# Patient Record
Sex: FEMALE | Race: OTHER | HISPANIC OR LATINO
[De-identification: names, ages, dates, MRNs, and addresses within clinical notes are randomized per-mention and may not be internally consistent; named-entity substitution may affect disease eponyms.]

---

## 2022-07-25 ENCOUNTER — APPOINTMENT (OUTPATIENT)
Dept: ORTHOPEDIC SURGERY | Facility: CLINIC | Age: 52
End: 2022-07-25

## 2022-09-12 DIAGNOSIS — M25.562 PAIN IN LEFT KNEE: ICD-10-CM

## 2022-09-12 PROBLEM — Z00.00 ENCOUNTER FOR PREVENTIVE HEALTH EXAMINATION: Status: ACTIVE | Noted: 2022-09-12

## 2022-09-13 ENCOUNTER — APPOINTMENT (OUTPATIENT)
Dept: ORTHOPEDIC SURGERY | Facility: CLINIC | Age: 52
End: 2022-09-13

## 2022-09-13 VITALS — DIASTOLIC BLOOD PRESSURE: 75 MMHG | WEIGHT: 200 LBS | SYSTOLIC BLOOD PRESSURE: 113 MMHG | HEART RATE: 69 BPM

## 2022-09-13 PROCEDURE — 73564 X-RAY EXAM KNEE 4 OR MORE: CPT | Mod: LT

## 2022-09-13 PROCEDURE — 99204 OFFICE O/P NEW MOD 45 MIN: CPT | Mod: 25

## 2022-09-13 PROCEDURE — 20610 DRAIN/INJ JOINT/BURSA W/O US: CPT | Mod: LT

## 2022-09-13 NOTE — PHYSICAL EXAM
[de-identified] : GENERAL APPEARANCE: Well nourished and hydrated, pleasant, alert, and oriented x 3. Appears their stated age. \par HEENT: Normocephalic, extraocular eye motion intact. Nasal septum midline. Oral cavity clear. External auditory canal clear. \par RESPIRATORY: Breath sounds clear and audible in all lobes. No wheezing, No accessory muscle use.\par CARDIOVASCULAR: No apparent abnormalities. No lower leg edema. No varicosities. Pedal pulses are palpable.\par NEUROLOGIC: Sensation is normal, no muscle weakness in the upper or lower extremities.\par DERMATOLOGIC: No apparent skin lesions, moist, warm, no rash.\par SPINE: Cervical spine appears normal and moves freely; thoracic spine appears normal and moves freely; lumbosacral spine appears normal and moves freely, normal, nontender.\par MUSCULOSKELETAL: Hands, wrists, and elbows are normal and move freely, shoulders are normal and move freely. \par Psychiatric: Oriented to person, place, and time, insight and judgement were intact and the affect was normal. \par Musculoskeletal:. Left knee exam shows mild effusion, ROM is 0-1 25 degrees, no instability, no pain with Marsha, medial and lateral joint line tenderness. \par 5/5 motor strength in bilateral lower extremities. Sensory: Intact in bilateral lower extremities. DTRs: Biceps, brachioradialis, triceps, patellar, ankle and plantar 2+ and symmetric bilaterally. Pulses: dorsalis pedis, posterior tibial, femoral, popliteal, and radial 2+ and symmetric bilaterally. \par Constitutional: Alert and in no acute distress, but well-appearing.  [de-identified] : 4 views of the left knee obtained the office today show no acute fracture or dislocation.  There is medial joint space narrowing subchondral sclerosis small osteophyte formation mild tricompartmental degenerative changes consistent with Kellgren-Danny grade 2 changes.

## 2022-09-13 NOTE — DISCUSSION/SUMMARY
[Medication Risks Reviewed] : Medication risks reviewed [Surgical risks reviewed] : Surgical risks reviewed [de-identified] : Patient is a 52-year-old female with mild to moderate left knee osteoarthritis most pronounced the medial compartment presenting today for initial evaluation.  I recommended conservative treatment at this time.  I have given her a injection of cortisone which she tolerated well.  I recommended physical therapy and she should continue with that.  She is continue take NSAIDs as needed for the pain.  I will see her back in 2 months for repeat evaluation.  All questions were asked and answered with

## 2022-09-13 NOTE — REASON FOR VISIT
[Initial Visit] : an initial visit for [Other: ____] : [unfilled] [[employer] ] : provided by [employer]   [Interpreters_IDNumber] : [id number] [Interpreters_FullName] : Aron [[id number]] : English

## 2022-09-13 NOTE — PROCEDURE
[de-identified] : I injected his left knee.\par I discussed at length with the patient the planned steroid and lidocaine injection. The risks, benefits, convalescence and alternatives were reviewed. The possible side effects discussed included but were not limited to: pain, swelling, heat, bleeding, and redness. Symptoms are generally mild but if they are extensive then contact the office. Giving pain relievers by mouth such as NSAIDs or Tylenol can generally treat the reactions to steroid and lidocaine. Rare cases of infection have been noted. Rash, hives and itching may occur post injection. If you have muscle pain or cramps, flushing and or swelling of the face, rapid heart beat, nausea, dizziness, fever, chills, headache, difficulty breathing, swelling in the arms or legs, or have a prickly feeling of your skin, contact a health care provider immediately. Following this discussion, the knee was prepped with Alcohol and under sterile condition the 80 mg Depo-Medrol and 6 cc Lidocaine injection was performed with a 20 gauge needle through a superolateral injection site. The needle was introduced into the joint, aspiration was performed to ensure intra-articular placement and the medication was injected. Upon withdrawal of the needle the site was cleaned with alcohol and a band aid applied. The patient tolerated the injection well and there were no adverse effects. Post injection instructions included no strenuous activity for 24 hours, cryotherapy and if there are any adverse effects to contact the office.\par

## 2022-09-13 NOTE — HISTORY OF PRESENT ILLNESS
[Worsening] : worsening [6] : an average pain level of 6/10 [9] : a maximum pain level of 9/10 [Standing] : standing [Daily] : ~He/She~ states the symptoms seem to be occuring daily [Bending] : worsened by bending [Direct Pressure] : worsened by direct pressure [Sitting] : worsened by sitting [Walking] : worsened by walking [Knee Flexion] : worsened with knee flexion [Knee Extension] : worsened with knee extension [NSAIDs] : relieved by nonsteroidal anti-inflammatory drugs [Rest] : relieved by rest [de-identified] : 52-year-old female with past medical history of hypertension and diabetes presents to the office for initial evaluation of left knee pain.  Patient states she has had the left  knee pain for the past 6 months.  She has seen 2 orthopedists in the past, she was diagnosed with a Baker's cyst that burst.  She has been to physical therapy with little relief of pain, and has also received a cortisone injection which helped for short period of time.  She has been taking meloxicam for the pain with moderate relief.  She states the pain is mostly over the lateral and medial joint lines as well as the posterior aspect of the knee, it is localized and does not radiate to the hip or the back.  She also complains of swelling to the left knee.  Patient states the pain is exacerbated by walking, rising from a seated position, getting out of the car.  States that she does sometimes need to use a cane to ambulate.  Patient states that the last orthopedist that she saw said that she needed surgery but he did not take her insurance which is why she is here today.  Denies any recent falls or injury, locking catching or buckling of the left knee, numbness/tingling. [Hip Movement] : not worsened by hip movement [Acetaminophen] : not relieved by acetaminophen [Physical Therapy] : not relieved by physical therapy

## 2022-11-14 ENCOUNTER — APPOINTMENT (OUTPATIENT)
Dept: ORTHOPEDIC SURGERY | Facility: CLINIC | Age: 52
End: 2022-11-14

## 2022-11-14 VITALS
WEIGHT: 195 LBS | DIASTOLIC BLOOD PRESSURE: 79 MMHG | HEIGHT: 61 IN | HEART RATE: 78 BPM | BODY MASS INDEX: 36.82 KG/M2 | SYSTOLIC BLOOD PRESSURE: 118 MMHG

## 2022-11-14 DIAGNOSIS — M17.12 UNILATERAL PRIMARY OSTEOARTHRITIS, LEFT KNEE: ICD-10-CM

## 2022-11-14 PROCEDURE — 99213 OFFICE O/P EST LOW 20 MIN: CPT

## 2022-11-14 NOTE — HISTORY OF PRESENT ILLNESS
[Improving] : improving [Intermit.] : ~He/She~ states the symptoms seem to be intermittent [Exercise Regimen] : relieved by exercise regimen [Physical Therapy] : relieved by physical therapy [de-identified] : 52 year old female presents to office for follow up of left knee pain following the start of conservative therapies. The patient received a cortisone injection at her list visit in September. She states that the injection has provided her with very good pain relief. She is able to walk and move much easier. She states that her pain has resloved and she only feels some achiness in left knee sometimes. She has not required pain medication. She has been attending physical therapy. Denies any recent injuires or falls, new symptoms. She states that she is having a surgical procedure next week to remove uterine fibroids and plans to return to PT after the procedure is complete.

## 2022-11-14 NOTE — PHYSICAL EXAM
[de-identified] : Musculoskeletal:. Left knee exam shows no effusion, ROM is 0-1 25 degrees, no instability, no pain with Marsha, medial and lateral joint line tenderness. \par 5/5 motor strength in bilateral lower extremities. Sensory: Intact in bilateral lower extremities. DTRs: Biceps, brachioradialis, triceps, patellar, ankle and plantar 2+ and symmetric bilaterally. Pulses: dorsalis pedis, posterior tibial, femoral, popliteal, and radial 2+ and symmetric bilaterally. \par Constitutional: Alert and in no acute distress, but well-appearing.

## 2022-11-14 NOTE — DISCUSSION/SUMMARY
[Medication Risks Reviewed] : Medication risks reviewed [de-identified] : Patient is a 52-year-old female with left knee osteoarthritis presenting today for follow-up.  She has had excellent response to cortisone injection since her last visit.  She is no longer having any pain or dysfunction of the knee.  She is back to her daily activities.  I recommended she take Tylenol and NSAIDs as needed for the pain.  She is continue with her at home exercises.  I will see her back on an as-needed basis for her left knee.  All questions were asked and answered with .

## 2022-11-14 NOTE — REASON FOR VISIT
[Follow-Up Visit] : a follow-up visit for [Other: ____] : [unfilled] [[employer] ] : provided by [employer]   [Interpreters_IDNumber] : [id number] [Interpreters_FullName] : Tisha [[id number]] : Hungarian

## 2023-08-29 ENCOUNTER — OFFICE (OUTPATIENT)
Dept: URBAN - METROPOLITAN AREA CLINIC 110 | Facility: CLINIC | Age: 53
Setting detail: OPHTHALMOLOGY
End: 2023-08-29
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
## 2023-08-29 PROCEDURE — NO SHOW FE NO SHOW FEE: Performed by: OPHTHALMOLOGY

## 2023-09-18 ENCOUNTER — APPOINTMENT (OUTPATIENT)
Dept: ORTHOPEDIC SURGERY | Facility: CLINIC | Age: 53
End: 2023-09-18

## 2023-09-26 ENCOUNTER — INPATIENT (INPATIENT)
Facility: HOSPITAL | Age: 53
LOS: 0 days | Discharge: HOME | DRG: 84 | End: 2023-09-27
Attending: STUDENT IN AN ORGANIZED HEALTH CARE EDUCATION/TRAINING PROGRAM | Admitting: STUDENT IN AN ORGANIZED HEALTH CARE EDUCATION/TRAINING PROGRAM
Payer: COMMERCIAL

## 2023-09-26 ENCOUNTER — EMERGENCY (EMERGENCY)
Facility: HOSPITAL | Age: 53
LOS: 1 days | Discharge: ANOTHER HOSPITAL | End: 2023-09-26
Attending: EMERGENCY MEDICINE | Admitting: EMERGENCY MEDICINE
Payer: MEDICAID

## 2023-09-26 VITALS
OXYGEN SATURATION: 94 % | DIASTOLIC BLOOD PRESSURE: 83 MMHG | HEART RATE: 62 BPM | WEIGHT: 199.96 LBS | RESPIRATION RATE: 18 BRPM | SYSTOLIC BLOOD PRESSURE: 132 MMHG | TEMPERATURE: 97 F

## 2023-09-26 VITALS
TEMPERATURE: 98 F | HEART RATE: 60 BPM | RESPIRATION RATE: 16 BRPM | DIASTOLIC BLOOD PRESSURE: 72 MMHG | OXYGEN SATURATION: 98 % | SYSTOLIC BLOOD PRESSURE: 113 MMHG

## 2023-09-26 VITALS
HEIGHT: 67 IN | SYSTOLIC BLOOD PRESSURE: 151 MMHG | WEIGHT: 199.96 LBS | RESPIRATION RATE: 16 BRPM | OXYGEN SATURATION: 99 % | HEART RATE: 57 BPM | DIASTOLIC BLOOD PRESSURE: 84 MMHG

## 2023-09-26 DIAGNOSIS — I62.00 NONTRAUMATIC SUBDURAL HEMORRHAGE, UNSPECIFIED: ICD-10-CM

## 2023-09-26 LAB
ALBUMIN SERPL ELPH-MCNC: 3.8 G/DL — SIGNIFICANT CHANGE UP (ref 3.3–5)
ALP SERPL-CCNC: 109 U/L — SIGNIFICANT CHANGE UP (ref 40–120)
ALT FLD-CCNC: 31 U/L — SIGNIFICANT CHANGE UP (ref 12–78)
ANION GAP SERPL CALC-SCNC: 4 MMOL/L — LOW (ref 5–17)
APTT BLD: 34 SEC — SIGNIFICANT CHANGE UP (ref 24.5–35.6)
AST SERPL-CCNC: 27 U/L — SIGNIFICANT CHANGE UP (ref 15–37)
BASOPHILS # BLD AUTO: 0.01 K/UL — SIGNIFICANT CHANGE UP (ref 0–0.2)
BASOPHILS NFR BLD AUTO: 0.1 % — SIGNIFICANT CHANGE UP (ref 0–2)
BILIRUB SERPL-MCNC: 0.3 MG/DL — SIGNIFICANT CHANGE UP (ref 0.2–1.2)
BUN SERPL-MCNC: 14 MG/DL — SIGNIFICANT CHANGE UP (ref 7–23)
CALCIUM SERPL-MCNC: 9.1 MG/DL — SIGNIFICANT CHANGE UP (ref 8.5–10.1)
CHLORIDE SERPL-SCNC: 110 MMOL/L — HIGH (ref 96–108)
CO2 SERPL-SCNC: 25 MMOL/L — SIGNIFICANT CHANGE UP (ref 22–31)
CREAT SERPL-MCNC: 0.64 MG/DL — SIGNIFICANT CHANGE UP (ref 0.5–1.3)
EGFR: 106 ML/MIN/1.73M2 — SIGNIFICANT CHANGE UP
EOSINOPHIL # BLD AUTO: 0.38 K/UL — SIGNIFICANT CHANGE UP (ref 0–0.5)
EOSINOPHIL NFR BLD AUTO: 5.6 % — SIGNIFICANT CHANGE UP (ref 0–6)
GLUCOSE SERPL-MCNC: 95 MG/DL — SIGNIFICANT CHANGE UP (ref 70–99)
HCT VFR BLD CALC: 39.3 % — SIGNIFICANT CHANGE UP (ref 34.5–45)
HGB BLD-MCNC: 13 G/DL — SIGNIFICANT CHANGE UP (ref 11.5–15.5)
IMM GRANULOCYTES NFR BLD AUTO: 0.6 % — SIGNIFICANT CHANGE UP (ref 0–0.9)
INR BLD: 0.96 RATIO — SIGNIFICANT CHANGE UP (ref 0.85–1.18)
LYMPHOCYTES # BLD AUTO: 2.51 K/UL — SIGNIFICANT CHANGE UP (ref 1–3.3)
LYMPHOCYTES # BLD AUTO: 37.1 % — SIGNIFICANT CHANGE UP (ref 13–44)
MCHC RBC-ENTMCNC: 29.2 PG — SIGNIFICANT CHANGE UP (ref 27–34)
MCHC RBC-ENTMCNC: 33.1 GM/DL — SIGNIFICANT CHANGE UP (ref 32–36)
MCV RBC AUTO: 88.3 FL — SIGNIFICANT CHANGE UP (ref 80–100)
MONOCYTES # BLD AUTO: 0.42 K/UL — SIGNIFICANT CHANGE UP (ref 0–0.9)
MONOCYTES NFR BLD AUTO: 6.2 % — SIGNIFICANT CHANGE UP (ref 2–14)
NEUTROPHILS # BLD AUTO: 3.4 K/UL — SIGNIFICANT CHANGE UP (ref 1.8–7.4)
NEUTROPHILS NFR BLD AUTO: 50.4 % — SIGNIFICANT CHANGE UP (ref 43–77)
NRBC # BLD: 0 /100 WBCS — SIGNIFICANT CHANGE UP (ref 0–0)
PLATELET # BLD AUTO: 282 K/UL — SIGNIFICANT CHANGE UP (ref 150–400)
POTASSIUM SERPL-MCNC: 4.2 MMOL/L — SIGNIFICANT CHANGE UP (ref 3.5–5.3)
POTASSIUM SERPL-SCNC: 4.2 MMOL/L — SIGNIFICANT CHANGE UP (ref 3.5–5.3)
PROT SERPL-MCNC: 8.1 G/DL — SIGNIFICANT CHANGE UP (ref 6–8.3)
PROTHROM AB SERPL-ACNC: 11.2 SEC — SIGNIFICANT CHANGE UP (ref 9.5–13)
RBC # BLD: 4.45 M/UL — SIGNIFICANT CHANGE UP (ref 3.8–5.2)
RBC # FLD: 12.6 % — SIGNIFICANT CHANGE UP (ref 10.3–14.5)
SODIUM SERPL-SCNC: 139 MMOL/L — SIGNIFICANT CHANGE UP (ref 135–145)
WBC # BLD: 6.76 K/UL — SIGNIFICANT CHANGE UP (ref 3.8–10.5)
WBC # FLD AUTO: 6.76 K/UL — SIGNIFICANT CHANGE UP (ref 3.8–10.5)

## 2023-09-26 PROCEDURE — 99285 EMERGENCY DEPT VISIT HI MDM: CPT

## 2023-09-26 PROCEDURE — 99285 EMERGENCY DEPT VISIT HI MDM: CPT | Mod: 25

## 2023-09-26 PROCEDURE — 85610 PROTHROMBIN TIME: CPT

## 2023-09-26 PROCEDURE — G1004: CPT

## 2023-09-26 PROCEDURE — 71260 CT THORAX DX C+: CPT | Mod: 26

## 2023-09-26 PROCEDURE — 73562 X-RAY EXAM OF KNEE 3: CPT | Mod: 26,LT

## 2023-09-26 PROCEDURE — 73590 X-RAY EXAM OF LOWER LEG: CPT

## 2023-09-26 PROCEDURE — 70450 CT HEAD/BRAIN W/O DYE: CPT | Mod: 26,MG

## 2023-09-26 PROCEDURE — 36415 COLL VENOUS BLD VENIPUNCTURE: CPT

## 2023-09-26 PROCEDURE — 99223 1ST HOSP IP/OBS HIGH 75: CPT | Mod: GC

## 2023-09-26 PROCEDURE — 80053 COMPREHEN METABOLIC PANEL: CPT

## 2023-09-26 PROCEDURE — 85025 COMPLETE CBC W/AUTO DIFF WBC: CPT

## 2023-09-26 PROCEDURE — 96365 THER/PROPH/DIAG IV INF INIT: CPT

## 2023-09-26 PROCEDURE — 74177 CT ABD & PELVIS W/CONTRAST: CPT | Mod: 26

## 2023-09-26 PROCEDURE — 70450 CT HEAD/BRAIN W/O DYE: CPT | Mod: MG

## 2023-09-26 PROCEDURE — 72125 CT NECK SPINE W/O DYE: CPT | Mod: MG

## 2023-09-26 PROCEDURE — 73562 X-RAY EXAM OF KNEE 3: CPT

## 2023-09-26 PROCEDURE — 85730 THROMBOPLASTIN TIME PARTIAL: CPT

## 2023-09-26 PROCEDURE — 70450 CT HEAD/BRAIN W/O DYE: CPT | Mod: 26,77

## 2023-09-26 PROCEDURE — 72125 CT NECK SPINE W/O DYE: CPT | Mod: 26,MG

## 2023-09-26 PROCEDURE — 73590 X-RAY EXAM OF LOWER LEG: CPT | Mod: 26,LT

## 2023-09-26 RX ORDER — HYDROMORPHONE HYDROCHLORIDE 2 MG/ML
0.5 INJECTION INTRAMUSCULAR; INTRAVENOUS; SUBCUTANEOUS EVERY 4 HOURS
Refills: 0 | Status: DISCONTINUED | OUTPATIENT
Start: 2023-09-26 | End: 2023-09-27

## 2023-09-26 RX ORDER — ACETAMINOPHEN 500 MG
1000 TABLET ORAL EVERY 6 HOURS
Refills: 0 | Status: DISCONTINUED | OUTPATIENT
Start: 2023-09-26 | End: 2023-09-27

## 2023-09-26 RX ORDER — KETOROLAC TROMETHAMINE 30 MG/ML
15 SYRINGE (ML) INJECTION EVERY 6 HOURS
Refills: 0 | Status: DISCONTINUED | OUTPATIENT
Start: 2023-09-26 | End: 2023-09-26

## 2023-09-26 RX ORDER — ACETAMINOPHEN 500 MG
1000 TABLET ORAL EVERY 6 HOURS
Refills: 0 | Status: DISCONTINUED | OUTPATIENT
Start: 2023-09-26 | End: 2023-09-26

## 2023-09-26 RX ORDER — LIDOCAINE 4 G/100G
1 CREAM TOPICAL ONCE
Refills: 0 | Status: COMPLETED | OUTPATIENT
Start: 2023-09-26 | End: 2023-09-26

## 2023-09-26 RX ORDER — SODIUM CHLORIDE 9 MG/ML
1000 INJECTION, SOLUTION INTRAVENOUS
Refills: 0 | Status: DISCONTINUED | OUTPATIENT
Start: 2023-09-26 | End: 2023-09-26

## 2023-09-26 RX ORDER — LEVETIRACETAM 250 MG/1
1000 TABLET, FILM COATED ORAL ONCE
Refills: 0 | Status: COMPLETED | OUTPATIENT
Start: 2023-09-26 | End: 2023-09-26

## 2023-09-26 RX ORDER — SENNA PLUS 8.6 MG/1
2 TABLET ORAL AT BEDTIME
Refills: 0 | Status: DISCONTINUED | OUTPATIENT
Start: 2023-09-26 | End: 2023-09-27

## 2023-09-26 RX ORDER — SODIUM CHLORIDE 9 MG/ML
1000 INJECTION INTRAMUSCULAR; INTRAVENOUS; SUBCUTANEOUS
Refills: 0 | Status: DISCONTINUED | OUTPATIENT
Start: 2023-09-26 | End: 2023-09-26

## 2023-09-26 RX ORDER — ACETAMINOPHEN 500 MG
975 TABLET ORAL ONCE
Refills: 0 | Status: COMPLETED | OUTPATIENT
Start: 2023-09-26 | End: 2023-09-26

## 2023-09-26 RX ORDER — OXYCODONE HYDROCHLORIDE 5 MG/1
10 TABLET ORAL EVERY 4 HOURS
Refills: 0 | Status: DISCONTINUED | OUTPATIENT
Start: 2023-09-26 | End: 2023-09-27

## 2023-09-26 RX ORDER — OXYCODONE HYDROCHLORIDE 5 MG/1
5 TABLET ORAL EVERY 4 HOURS
Refills: 0 | Status: DISCONTINUED | OUTPATIENT
Start: 2023-09-26 | End: 2023-09-27

## 2023-09-26 RX ADMIN — LIDOCAINE 1 PATCH: 4 CREAM TOPICAL at 12:08

## 2023-09-26 RX ADMIN — LIDOCAINE 1 PATCH: 4 CREAM TOPICAL at 18:59

## 2023-09-26 RX ADMIN — Medication 400 MILLIGRAM(S): at 20:39

## 2023-09-26 RX ADMIN — OXYCODONE HYDROCHLORIDE 10 MILLIGRAM(S): 5 TABLET ORAL at 23:47

## 2023-09-26 RX ADMIN — OXYCODONE HYDROCHLORIDE 10 MILLIGRAM(S): 5 TABLET ORAL at 22:14

## 2023-09-26 RX ADMIN — Medication 975 MILLIGRAM(S): at 12:08

## 2023-09-26 RX ADMIN — Medication 975 MILLIGRAM(S): at 13:20

## 2023-09-26 RX ADMIN — LEVETIRACETAM 1000 MILLIGRAM(S): 250 TABLET, FILM COATED ORAL at 13:54

## 2023-09-26 RX ADMIN — LEVETIRACETAM 440 MILLIGRAM(S): 250 TABLET, FILM COATED ORAL at 13:30

## 2023-09-26 NOTE — ED ADULT NURSE NOTE - NSFALLHARMRISKINTERV_ED_ALL_ED
Assistance OOB with selected safe patient handling equipment if applicable/Assistance with ambulation/Communicate risk of Fall with Harm to all staff, patient, and family/Monitor gait and stability/Provide visual cue: red socks, yellow wristband, yellow gown, etc/Reinforce activity limits and safety measures with patient and family/Bed in lowest position, wheels locked, appropriate side rails in place/Call bell, personal items and telephone in reach/Instruct patient to call for assistance before getting out of bed/chair/stretcher/Non-slip footwear applied when patient is off stretcher/[place] to call system/Physically safe environment - no spills, clutter or unnecessary equipment/Purposeful Proactive Rounding/Room/bathroom lighting operational, light cord in reach

## 2023-09-26 NOTE — ED ADULT NURSE NOTE - OBJECTIVE STATEMENT
Pt transfer from outside hospital, pt had fall backwards off counter yesterday hitting head w/ LOC, denies blood thinners, AOx3 upon ED arrival, able to DOBBS, c/o pain to right side of head and neck, pain to right arm and left lower leg, resp even and unlabored

## 2023-09-26 NOTE — ED PROVIDER NOTE - NS ED ATTENDING STATEMENT MOD
This was a shared visit with the LAILA. I reviewed and verified the documentation and independently performed the documented:

## 2023-09-26 NOTE — ED ADULT TRIAGE NOTE - CHIEF COMPLAINT QUOTE
pt was transferred from [facility] for SDH after falling approx 8 ft off the kitchen counter.  pt awake, alert and oriented x3 at this time.

## 2023-09-26 NOTE — H&P ADULT - ASSESSMENT
53 year old F, no PMH, presented after a fall at 6pm yesterday, fell off a counter, while cleaning kitchen cabinets, endorses HS with LOC, found to have a L SDH, mild associated SAH, CT C spine negative, she is also c/o L LE pain and buttock pain.    Plan:   Admit to Step down   q2 neurochecks  Repeat CTH at 1830   CT Chest abdomen and pelvis to complete trauma work up   Hold chemical VTE ppx

## 2023-09-26 NOTE — CONSULT NOTE ADULT - SUBJECTIVE AND OBJECTIVE BOX
HISTORY OF PRESENT ILLNESS:   53yF denies PMH only takes OTC vitamins, presented to [facility] after she had intractable headache and nausea since falling yesterday 9/25/23. Patient states she was standing on her kitchen counters ~ 3.5-4 feet up to clean her cabinets, was holding onto a cabinet door when it fell off, causing her to fall and having the cabinet land on her, +LOC. Daughter heard the fall, tried waking her up, got her father from outside who picked patient up and placed on couch and patient awoke without altered mental status. She had mild headache, took tylenol, felt a bit better but then later in the night developed worsening headache and nausea that wasn't improving which prompted ED visit today. CTH at [place] with acute SDH, transferred to [facility] for tertiary care. Patient attests to mild dizziness, severe LLE pain, and moderate neck pain, worse on the right lateral portion of her neck. Denies weakness, numbness, tingling, chest pain, dyspnea, abdominal pain    PAST MEDICAL & SURGICAL HISTORY:  No pertinent past medical history    SOCIAL HISTORY:  Tobacco Use: Denies  EtOH use: Denies  Substance: Denies    Allergies  No Known Allergies    REVIEW OF SYSTEMS  As noted in HPI    HOME MEDICATIONS:  Home Medications:      MEDICATIONS:  Antibiotics:    Neuro:    Anticoagulation:    OTHER:    IVF:    Vital Signs Last 24 Hrs  T(C): 36.9 (26 Sep 2023 13:51), Max: 36.9 (26 Sep 2023 13:51)  T(F): 98.4 (26 Sep 2023 13:51), Max: 98.4 (26 Sep 2023 13:51)  HR: 57 (26 Sep 2023 16:25) (57 - 62)  BP: 151/84 (26 Sep 2023 16:25) (113/72 - 151/84)  BP(mean): --  RR: 16 (26 Sep 2023 16:25) (16 - 18)  SpO2: 99% (26 Sep 2023 16:25) (94% - 99%)    Parameters below as of 26 Sep 2023 16:25  Patient On (Oxygen Delivery Method): room air    PHYSICAL EXAM:  GENERAL: NAD, well-groomed  HEAD: Atraumatic, normocephalic  NECK: + tenderness to palpation, worse on the right  BERNIE COMA SCORE: E- 4 V- 5 M - 6 =15  MENTAL STATUS: AAO x3; Appropriately conversant without aphasia; following commands  CRANIAL NERVES: PERRL. EOMI without nystagmus. Facial sensation intact V1-3 distribution b/l. Face symmetric w/ normal eye closure and smile, tongue midline. Hearing grossly intact. Speech clear  MOTOR: strength 5/5 b/l upper and lower extremities  SENSATION: grossly intact to light touch all extremities  EXTREMITIES: LLE ecchymoses    LABS:                        13.0   6.76  )-----------( 282      ( 26 Sep 2023 13:40 )             39.3     09-26    139  |  110<H>  |  14  ----------------------------<  95  4.2   |  25  |  0.64    Ca    9.1      26 Sep 2023 13:40    TPro  8.1  /  Alb  3.8  /  TBili  0.3  /  DBili  x   /  AST  27  /  ALT  31  /  AlkPhos  109  09-26    PT/INR - ( 26 Sep 2023 13:40 )   PT: 11.2 sec;   INR: 0.96 ratio       PTT - ( 26 Sep 2023 13:40 )  PTT:34.0 sec  Urinalysis Basic - ( 26 Sep 2023 13:40 )    Color: x / Appearance: x / SG: x / pH: x  Gluc: 95 mg/dL / Ketone: x  / Bili: x / Urobili: x   Blood: x / Protein: x / Nitrite: x   Leuk Esterase: x / RBC: x / WBC x   Sq Epi: x / Non Sq Epi: x / Bacteria: x    RADIOLOGY & ADDITIONAL STUDIES:  CT Head No Cont (09.26.23 @ 12:50)  IMPRESSION:  HEAD CT:  Subdural hematoma left side of anterior falx measuring up to 5   mm in thickness. Likely mild associated subarachnoid hemorrhage.  CERVICAL SPINE CT:  No evidence of an acute cervical spine fracture.  OTHER: 3.5 cm nodule right lobe of the thyroid gland. Correlate with   nonemergent ultrasonography if not recently performed    Xray Knee 3 Views, Left (09.26.23 @ 12:33)  IMPRESSION:  1. No evidence of fracture or dislocation.  2. Mild degenerative osteoarthritis of the medial greater than   patellofemoral compartments.    Xray Tibia + Fibula 2 Views, Left (09.26.23 @ 12:32)  IMPRESSION:  No acute findings

## 2023-09-26 NOTE — ED ADULT TRIAGE NOTE - CHIEF COMPLAINT QUOTE
I was cleaning at home yesterday and I slipped and fell about 6 feet and decorations fell onto me and I hit my head on the floor and I do not recall what happened until my  was waking me up

## 2023-09-26 NOTE — ED ADULT NURSE NOTE - IS THE PATIENT ABLE TO BE SCREENED?
PHYSICAL EXAM:  GENERAL: non-toxic appearing; in no respiratory distress  HEENT: Atraumatic, Normocephalic, PERRL, EOMs intact b/l w/out deficits, no conjunctival pallor, MMM  NECK: No JVD; FROM  CHEST/LUNG: CTAB no wheezes/rhonchi/rales  HEART: RRR no murmur/gallops/rubs  ABDOMEN: +BS, soft, ND, +RLQ ttp w/o rebound/guarding, no CVAT  EXTREMITIES: No LE edema, +2 radial pulses b/l, +2 DP/PT pulses b/l  MUSCULOSKELETAL: FROM of all 4 extremities  NERVOUS SYSTEM:  A&Ox3, No motor deficits or sensory deficits; no focal neurologic deficits  SKIN:  No new rashes
No

## 2023-09-26 NOTE — CONSULT NOTE ADULT - SUPERVISING ATTENDING
I personally reviewed the patient's imaging. History and plan discussed with NEPTALI Thurston, agree with above.

## 2023-09-26 NOTE — ED PROVIDER NOTE - OBJECTIVE STATEMENT
[place] - [phone] - dann  53 F denies significant pmhx, denies anticoagulation walked into ED c/o posterior head and neck pain, left leg pain s/p fall off counter yesterday while cleaning. States ~6 feet and believes she passed out briefly. Took tylenol yesterday. Denies dizziness, vision change, cp, sob, abd pain, vomiting, back pain, bowel/bladder complaint, numbness, weakness.

## 2023-09-26 NOTE — ED ADULT NURSE NOTE - OBJECTIVE STATEMENT
Pt. received alert and oriented x3 with chief complaint of slip and fall from height while cleaning. Pt. states height was about 6 feet and patient woke up on floor. Pt. received alert and oriented x3 with chief complaint of slip and fall from height while cleaning. Pt. states height was about 6 feet and patient woke up on floor. Pt. states pain to head and neck and left lower extremity. Pt. presents w/ bruising to anterior left lower extremity.

## 2023-09-26 NOTE — ED ADULT NURSE NOTE - CHIEF COMPLAINT QUOTE
pt was transferred from [facility] for SDH after falling approx 8 ft off the kitchen counter.  pt awake, alert and oriented x3 at this time.

## 2023-09-26 NOTE — H&P ADULT - HISTORY OF PRESENT ILLNESS
53 year old F, no PMH, presented after a fall at 6pm yesterday, fell off a counter, while cleaning kitchen cabinets, endorses HS with LOC, discovered by her  and child, no seizure like episodes witnessed, denies headache, no blurred vision, no nausea or vomiting, she is c/o L LE pain but was able to ambulate after the fall, denies additional complaints.

## 2023-09-26 NOTE — H&P ADULT - NSHPPHYSICALEXAM_GEN_ALL_CORE
GENERAL: Alert, well developed, in no acute distress.  HEENT: C- Collar in place   RESPIRATORY: CTAB. No wheezing, rales or rhonchi.  CARDIOVASCULAR: RRR. No audible murmurs, rubs or gallops.   GASTROINTESTINAL: Abdomen soft, NT, ND, -R/-G.  No pulsatile mass, no flank tenderness or suprapubic tenderness. No hepatosplenomegaly.  Pelvis stable.  Bruise over sacrum.    NEUROLOGIC: Cranial nerves II-XII grossly intact. No focal neurological deficits. Moves all extremities spontaneously. Sensation intact bilaterally.  INTEGUMENTARY: Ecchymoses overlying left lower extremity  Extremities: Palpable pedal pulses, tenderness over the L mid tibial region

## 2023-09-26 NOTE — ED PROVIDER NOTE - OBJECTIVE STATEMENT
The patient is a 53 year old female presents with a mechanical fall with SDH transferred from [facility]  The patient complains of HA, neck pain and L lower extremity  +LOC  No CP, No SOB, No abd pain, No motor No sensory loss

## 2023-09-26 NOTE — H&P ADULT - NSHPLABSRESULTS_GEN_ALL_CORE
.  LABS:                         13.0   6.76  )-----------( 282      ( 26 Sep 2023 13:40 )             39.3     09-26    139  |  110<H>  |  14  ----------------------------<  95  4.2   |  25  |  0.64    Ca    9.1      26 Sep 2023 13:40    TPro  8.1  /  Alb  3.8  /  TBili  0.3  /  DBili  x   /  AST  27  /  ALT  31  /  AlkPhos  109  09-26    PT/INR - ( 26 Sep 2023 13:40 )   PT: 11.2 sec;   INR: 0.96 ratio         PTT - ( 26 Sep 2023 13:40 )  PTT:34.0 sec  Urinalysis Basic - ( 26 Sep 2023 13:40 )    Color: x / Appearance: x / SG: x / pH: x  Gluc: 95 mg/dL / Ketone: x  / Bili: x / Urobili: x   Blood: x / Protein: x / Nitrite: x   Leuk Esterase: x / RBC: x / WBC x   Sq Epi: x / Non Sq Epi: x / Bacteria: x            RADIOLOGY, EKG & ADDITIONAL TESTS: Reviewed.      HEAD CT FINDINGS:  HEMORRHAGE: Small acute subdural hematoma along the left side of anterior falx measuring up to 5 mm in thickness. Likely mild associated subarachnoid hemorrhage.  BRAIN PARENCHYMA: No abnormal regions of parenchymal attenuation. No mass or mass effect.  VENTRICLES / SHIFT: No hydrocephalus. No midline shift.  EXTRA-AXIAL / BASAL CISTERNS: No extra-axial mass. Basal cisterns preserved.  CALVARIUM AND EXTRACRANIAL SOFT TISSUES: No depressed calvarial fracture.  SINUSES, ORBITS, MASTOIDS: Chronic deformity medial wall right orbit. Mild mucosal thickening within the maxillary sinuses.    CERVICAL SPINE FINDINGS:  FRACTURES: No evidence of an acute cervical spine fracture.  ALIGNMENT: Straightening of the normal cervical lordosis. No subluxation.  SPINAL COLUMN: Disc heights and vertebral body heights are well-maintained.  OTHER: No prevertebral soft tissue swelling. 3.5 cm nodule right lobe of the thyroid gland. Correlate with nonemergent ultrasonography if not recently performed    IMPRESSION:  HEAD CT: Subdural hematoma left side of anterior falx measuring up to 5 mm in thickness. Likely mild associated subarachnoid hemorrhage.  CERVICAL SPINE CT: No evidence of an acute cervical spine fracture.  OTHER: 3.5 cm nodule right lobe of the thyroid gland. Correlate with nonemergent ultrasonography if not recently performed

## 2023-09-26 NOTE — ED ADULT NURSE NOTE - NS_NURSE_BED_LOCATION_ED_ALL_ED
[facility] + [facility]  PATIENT NUTRITION    Dear Dori Nielsen MD,    I had the pleasure of seeing Russ Live at the [facility] Shanthi Nowak, CONGN, CSP, CD     Yes (specify)

## 2023-09-26 NOTE — ED PROVIDER NOTE - CLINICAL SUMMARY MEDICAL DECISION MAKING FREE TEXT BOX
mechanical fall with head injury, LOC. c/o HA, knee/leg pain. concern for head injury/leg injury. CTs, xray.

## 2023-09-26 NOTE — ED PROVIDER NOTE - CLINICAL SUMMARY MEDICAL DECISION MAKING FREE TEXT BOX
The patient had a fall with SDH transferred from [facility] for trauma and neurosurgery service

## 2023-09-26 NOTE — ED PROVIDER NOTE - PROGRESS NOTE DETAILS
dr araujo (nsgy) who accepts transfer and recommended dose 1g keppra    unable to speak to trauma attending at this time - currently occupied with a trauma - transfer center will relay message    dallas murcia [phone] used to discuss results and plan with pt who understands and agrees

## 2023-09-26 NOTE — ED PROVIDER NOTE - ATTENDING APP SHARED VISIT CONTRIBUTION OF CARE
Patient came into the ED c/o HA/neck pain and left knee/leg pain since yesterday after she fell off a counter while cleaning. hitting her head. +LOC. no blood thinners. has been ambulatory since the event. +nausea. no vomiting.     A&Ox3. NAD. GCS 15. PERRLx2. neck supple. no midline tenderness. right cervical paraspinal tenderness/spasm. Lungs CTA, equal and b/l, no r/r/w. S1S2, no murmurs, RRR. ABdomen soft, nt/nd. +PMSx4. +bruising/swelling left knee/lower leg. no pelvic bone tenderness. FROM legs. Ambulatory with steady gait.     Concern for traumatic SDH. for transfer.

## 2023-09-26 NOTE — CONSULT NOTE ADULT - ASSESSMENT
53yF denies PMH only takes OTC vitamins, presented to [facility] after she had intractable headache and nausea since falling off kitchen counter yesterday 9/25/23, +LOC  - No focal neurologic deficits  - Denies ACT/APT    PLAN:  - D/w Dr. Thurston  - Ok for Q2 neuro checks in SDU under trauma  - Repeat CTH 6 hours from last, ~ 18:30, stat with any change in neurologic status  - Pain control PRN  - Normotensive BP goals  - Hold ACT/APT for now  - SCDs for DVT ppx  - Supportive care/further medical management per primary team

## 2023-09-26 NOTE — ED PROVIDER NOTE - MUSCULOSKELETAL, MLM
Diffuse cervical tenderness. Back non tender. FROM x 4. LLE: contusion just below knee, +FROM, no deformity, +NVI.

## 2023-09-26 NOTE — ED PROVIDER NOTE - CARE PLAN
1 Principal Discharge DX:	Traumatic subdural hematoma with loss of consciousness   Principal Discharge DX:	Traumatic subdural hematoma with loss of consciousness  Secondary Diagnosis:	Contusion, knee and lower leg

## 2023-09-26 NOTE — ED ADULT NURSE REASSESSMENT NOTE - NS ED NURSE REASSESS COMMENT FT1
pt non compliant with c-collar, education provided + teach back method. pt c/o left leg pain, bruising noted, + pulses. meds as rxd.
report  given to  cameron dickerson rn, pt transported to cdu. c collar cleared  by choco quigley. pt ambulated w/ steady gait
received pt from off going shift. pt a&ox3, denies any pain/discomfort. returned from CT. pending bed. c-collar in place. neuro as documented. updated on plan of care, verbalize understanding. family @ bedside. call bell in reach

## 2023-09-27 ENCOUNTER — TRANSCRIPTION ENCOUNTER (OUTPATIENT)
Age: 53
End: 2023-09-27

## 2023-09-27 VITALS
OXYGEN SATURATION: 100 % | RESPIRATION RATE: 18 BRPM | DIASTOLIC BLOOD PRESSURE: 70 MMHG | TEMPERATURE: 98 F | HEART RATE: 67 BPM | SYSTOLIC BLOOD PRESSURE: 117 MMHG

## 2023-09-27 PROBLEM — Z78.9 OTHER SPECIFIED HEALTH STATUS: Chronic | Status: ACTIVE | Noted: 2023-09-26

## 2023-09-27 LAB
ANION GAP SERPL CALC-SCNC: 14 MMOL/L — SIGNIFICANT CHANGE UP (ref 5–17)
BLD GP AB SCN SERPL QL: SIGNIFICANT CHANGE UP
BUN SERPL-MCNC: 14 MG/DL — SIGNIFICANT CHANGE UP (ref 8–20)
CALCIUM SERPL-MCNC: 8.7 MG/DL — SIGNIFICANT CHANGE UP (ref 8.4–10.5)
CHLORIDE SERPL-SCNC: 102 MMOL/L — SIGNIFICANT CHANGE UP (ref 96–108)
CO2 SERPL-SCNC: 23 MMOL/L — SIGNIFICANT CHANGE UP (ref 22–29)
CREAT SERPL-MCNC: 0.67 MG/DL — SIGNIFICANT CHANGE UP (ref 0.5–1.3)
EGFR: 104 ML/MIN/1.73M2 — SIGNIFICANT CHANGE UP
GLUCOSE SERPL-MCNC: 130 MG/DL — HIGH (ref 70–99)
HCT VFR BLD CALC: 37.6 % — SIGNIFICANT CHANGE UP (ref 34.5–45)
HGB BLD-MCNC: 12.6 G/DL — SIGNIFICANT CHANGE UP (ref 11.5–15.5)
MAGNESIUM SERPL-MCNC: 2.1 MG/DL — SIGNIFICANT CHANGE UP (ref 1.8–2.6)
MCHC RBC-ENTMCNC: 29.3 PG — SIGNIFICANT CHANGE UP (ref 27–34)
MCHC RBC-ENTMCNC: 33.5 GM/DL — SIGNIFICANT CHANGE UP (ref 32–36)
MCV RBC AUTO: 87.4 FL — SIGNIFICANT CHANGE UP (ref 80–100)
MRSA PCR RESULT.: SIGNIFICANT CHANGE UP
PHOSPHATE SERPL-MCNC: 4.9 MG/DL — HIGH (ref 2.4–4.7)
PLATELET # BLD AUTO: 280 K/UL — SIGNIFICANT CHANGE UP (ref 150–400)
POTASSIUM SERPL-MCNC: 3.4 MMOL/L — LOW (ref 3.5–5.3)
POTASSIUM SERPL-SCNC: 3.4 MMOL/L — LOW (ref 3.5–5.3)
RBC # BLD: 4.3 M/UL — SIGNIFICANT CHANGE UP (ref 3.8–5.2)
RBC # FLD: 12.7 % — SIGNIFICANT CHANGE UP (ref 10.3–14.5)
S AUREUS DNA NOSE QL NAA+PROBE: DETECTED
SODIUM SERPL-SCNC: 139 MMOL/L — SIGNIFICANT CHANGE UP (ref 135–145)
WBC # BLD: 6.11 K/UL — SIGNIFICANT CHANGE UP (ref 3.8–10.5)
WBC # FLD AUTO: 6.11 K/UL — SIGNIFICANT CHANGE UP (ref 3.8–10.5)

## 2023-09-27 PROCEDURE — 99238 HOSP IP/OBS DSCHRG MGMT 30/<: CPT

## 2023-09-27 PROCEDURE — 83735 ASSAY OF MAGNESIUM: CPT

## 2023-09-27 PROCEDURE — 70450 CT HEAD/BRAIN W/O DYE: CPT | Mod: MG

## 2023-09-27 PROCEDURE — 86900 BLOOD TYPING SEROLOGIC ABO: CPT

## 2023-09-27 PROCEDURE — 85027 COMPLETE CBC AUTOMATED: CPT

## 2023-09-27 PROCEDURE — 97167 OT EVAL HIGH COMPLEX 60 MIN: CPT

## 2023-09-27 PROCEDURE — 80048 BASIC METABOLIC PNL TOTAL CA: CPT

## 2023-09-27 PROCEDURE — 86850 RBC ANTIBODY SCREEN: CPT

## 2023-09-27 PROCEDURE — 99291 CRITICAL CARE FIRST HOUR: CPT

## 2023-09-27 PROCEDURE — 99233 SBSQ HOSP IP/OBS HIGH 50: CPT

## 2023-09-27 PROCEDURE — T1013: CPT

## 2023-09-27 PROCEDURE — 71260 CT THORAX DX C+: CPT | Mod: MA

## 2023-09-27 PROCEDURE — 74177 CT ABD & PELVIS W/CONTRAST: CPT | Mod: MA

## 2023-09-27 PROCEDURE — G1004: CPT

## 2023-09-27 PROCEDURE — 87641 MR-STAPH DNA AMP PROBE: CPT

## 2023-09-27 PROCEDURE — 84100 ASSAY OF PHOSPHORUS: CPT

## 2023-09-27 PROCEDURE — 86901 BLOOD TYPING SEROLOGIC RH(D): CPT

## 2023-09-27 PROCEDURE — 87640 STAPH A DNA AMP PROBE: CPT

## 2023-09-27 PROCEDURE — 36415 COLL VENOUS BLD VENIPUNCTURE: CPT

## 2023-09-27 RX ORDER — LEVETIRACETAM 250 MG/1
1 TABLET, FILM COATED ORAL
Qty: 14 | Refills: 0
Start: 2023-09-27

## 2023-09-27 RX ORDER — POTASSIUM CHLORIDE 20 MEQ
40 PACKET (EA) ORAL EVERY 4 HOURS
Refills: 0 | Status: COMPLETED | OUTPATIENT
Start: 2023-09-27 | End: 2023-09-27

## 2023-09-27 RX ADMIN — Medication 40 MILLIEQUIVALENT(S): at 06:02

## 2023-09-27 RX ADMIN — Medication 40 MILLIEQUIVALENT(S): at 11:42

## 2023-09-27 RX ADMIN — Medication 400 MILLIGRAM(S): at 11:42

## 2023-09-27 RX ADMIN — Medication 1000 MILLIGRAM(S): at 13:06

## 2023-09-27 NOTE — DISCHARGE NOTE PROVIDER - CARE PROVIDER_API CALL
Bertrand Juliet  Neurosurgery  [address] [place] [id number]  Phone: [phone]  Fax: [phone]  Follow Up Time: 1 week

## 2023-09-27 NOTE — PHYSICAL THERAPY INITIAL EVALUATION ADULT - ADDITIONAL COMMENTS
Pt AxOx4 states she lives at home with  with 3STE 2HR and 0 steps inside. Pt was independent PTA without use or owning DME.

## 2023-09-27 NOTE — PROGRESS NOTE ADULT - SUBJECTIVE AND OBJECTIVE BOX
INTERVAL HPI/OVERNIGHT EVENTS/SUBJECTIVE:  Pt admitted yesterday from [facility] with SDH.  Repeat imaging overnight stable.  CT CAP without injury other than contusion to soft tissue on back.  Pt overnight wanted c collar off.  Tenderness paravertebral, no midline tenderness.  Able to range neck without bony pain.  Collar removed.  No acute events.      ICU Vital Signs Last 24 Hrs  T(C): 36.5 (27 Sep 2023 04:02), Max: 36.9 (26 Sep 2023 13:51)  T(F): 97.7 (27 Sep 2023 04:02), Max: 98.4 (26 Sep 2023 13:51)  HR: 60 (27 Sep 2023 04:02) (57 - 65)  BP: 99/64 (27 Sep 2023 04:02) (99/64 - 151/84)  BP(mean): 76 (27 Sep 2023 04:02) (76 - 76)  ABP: --  ABP(mean): --  RR: 18 (27 Sep 2023 04:02) (16 - 18)  SpO2: 97% (27 Sep 2023 04:02) (94% - 100%)    O2 Parameters below as of 27 Sep 2023 04:02  Patient On (Oxygen Delivery Method): room air            I&O's Detail            MEDICATIONS  (STANDING):  potassium chloride    Tablet ER 40 milliEquivalent(s) Oral every 4 hours  senna 2 Tablet(s) Oral at bedtime    MEDICATIONS  (PRN):  acetaminophen   IVPB .. 1000 milliGRAM(s) IV Intermittent every 6 hours PRN Mild Pain (1 - 3)  HYDROmorphone  Injectable 0.5 milliGRAM(s) IV Push every 4 hours PRN Severe Pain (7 - 10)  oxyCODONE    IR 5 milliGRAM(s) Oral every 4 hours PRN Moderate Pain (4 - 6)  oxyCODONE    IR 10 milliGRAM(s) Oral every 4 hours PRN Severe Pain (7 - 10)      NUTRITION/IVF: Regular/IVL        PHYSICAL EXAM:    Gen:  NAD    Eyes:  EOMI's BL    Neurological:  GCS 15, no deficit    ENMT:  MMM    Neck:  Trachea midline, supple, no midline tenderness    Pulmonary:  Unlabored    Cardiovascular:  NSR    Gastrointestinal:  Obese abd, soft NTND    Genitourinary:  Voidds    Extremities:  AFROM x4.      Skin:  Intact    Musculoskeletal:  No deformity or joint effusion noted          LABS:  CBC Full  -  ( 27 Sep 2023 03:50 )  WBC Count : 6.11 K/uL  RBC Count : 4.30 M/uL  Hemoglobin : 12.6 g/dL  Hematocrit : 37.6 %  Platelet Count - Automated : 280 K/uL  Mean Cell Volume : 87.4 fl  Mean Cell Hemoglobin : 29.3 pg  Mean Cell Hemoglobin Concentration : 33.5 gm/dL  Auto Neutrophil # : x  Auto Lymphocyte # : x  Auto Monocyte # : x  Auto Eosinophil # : x  Auto Basophil # : x  Auto Neutrophil % : x  Auto Lymphocyte % : x  Auto Monocyte % : x  Auto Eosinophil % : x  Auto Basophil % : x    09-27    139  |  102  |  14.0  ----------------------------<  130<H>  3.4<L>   |  23.0  |  0.67    Ca    8.7      27 Sep 2023 03:50  Phos  4.9     09-27  Mg     2.1     09-27    TPro  8.1  /  Alb  3.8  /  TBili  0.3  /  DBili  x   /  AST  27  /  ALT  31  /  AlkPhos  109  09-26    PT/INR - ( 26 Sep 2023 13:40 )   PT: 11.2 sec;   INR: 0.96 ratio         PTT - ( 26 Sep 2023 13:40 )  PTT:34.0 sec  Urinalysis Basic - ( 27 Sep 2023 03:50 )    Color: x / Appearance: x / SG: x / pH: x  Gluc: 130 mg/dL / Ketone: x  / Bili: x / Urobili: x   Blood: x / Protein: x / Nitrite: x   Leuk Esterase: x / RBC: x / WBC x   Sq Epi: x / Non Sq Epi: x / Bacteria: x      RECENT CULTURES:      LIVER FUNCTIONS - ( 26 Sep 2023 13:40 )  Alb: 3.8 g/dL / Pro: 8.1 g/dL / ALK PHOS: 109 U/L / ALT: 31 U/L / AST: 27 U/L / GGT: x               CAPILLARY BLOOD GLUCOSE      RADIOLOGY & ADDITIONAL STUDIES:    ASSESSMENT/PLAN:  53yFemale presenting with:  s/p fall with acute traumatic SDH    Neuro:  Repeat Head CT stable, Q2 hr Neurochecks can be deescalated to Q4.      CV:  Hemodynamically normal    Pulm:  RA, unlabored, OOBTC    GI/Nutrition:  Regular diet    /Renal:  Voids    ID:  No issues    Lines/Tubes:  PIV    Endo:  No issues    Skin:  Intact    Proph:  SCDs, can start DVT PPX  tonight likely    Dispo:  Downgrade to floor today, OT ordered      CRITICAL CARE TIME SPENT:
INTERVAL HPI/OVERNIGHT EVENTS:  53yF denies PMH only takes OTC vitamins, presented to [facility] after she had intractable headache and nausea since falling off kitchen counter 9/25/23, +LOC, tx to [facility] after CTH with falcine SDH. Patient seen this AM, reports improvement in neck pain and headache. Attests to mild dizziness that she thinks is attributed to pain medication she received. Repeat CTH yesterday stable    Vital Signs Last 24 Hrs  T(C): 36.7 (27 Sep 2023 08:00), Max: 36.9 (26 Sep 2023 13:51)  T(F): 98.1 (27 Sep 2023 08:00), Max: 98.4 (26 Sep 2023 13:51)  HR: 65 (27 Sep 2023 08:00) (57 - 65)  BP: 101/64 (27 Sep 2023 08:00) (99/64 - 151/84)  BP(mean): 92 (27 Sep 2023 06:07) (76 - 92)  RR: 18 (27 Sep 2023 08:00) (16 - 18)  SpO2: 98% (27 Sep 2023 08:00) (94% - 100%)    Parameters below as of 27 Sep 2023 08:00  Patient On (Oxygen Delivery Method): room air    PHYSICAL EXAM:  GENERAL: NAD, well-groomed  HEAD: Atraumatic, normocephalic  NECK: improved tenderness  BERNIE COMA SCORE: E- 4 V- 5 M - 6 =15  MENTAL STATUS: AAO x3; Appropriately conversant without aphasia; following commands  CRANIAL NERVES: PERRL. EOMI without nystagmus. Facial sensation intact V1-3 distribution b/l. Face symmetric w/ normal eye closure and smile, tongue midline. Hearing grossly intact. Speech clear  MOTOR: strength 5/5 b/l upper and lower extremities  SENSATION: grossly intact to light touch all extremities  EXTREMITIES: LLE ecchymoses    LABS:                        12.6   6.11  )-----------( 280      ( 27 Sep 2023 03:50 )             37.6     09-27    139  |  102  |  14.0  ----------------------------<  130<H>  3.4<L>   |  23.0  |  0.67    Ca    8.7      27 Sep 2023 03:50  Phos  4.9     09-27  Mg     2.1     09-27    TPro  8.1  /  Alb  3.8  /  TBili  0.3  /  DBili  x   /  AST  27  /  ALT  31  /  AlkPhos  109  09-26    PT/INR - ( 26 Sep 2023 13:40 )   PT: 11.2 sec;   INR: 0.96 ratio      PTT - ( 26 Sep 2023 13:40 )  PTT:34.0 sec  Urinalysis Basic - ( 27 Sep 2023 03:50 )    Color: x / Appearance: x / SG: x / pH: x  Gluc: 130 mg/dL / Ketone: x  / Bili: x / Urobili: x   Blood: x / Protein: x / Nitrite: x   Leuk Esterase: x / RBC: x / WBC x   Sq Epi: x / Non Sq Epi: x / Bacteria: x    RADIOLOGY & ADDITIONAL TESTS:  CT Abdomen and Pelvis w/ IV Cont (09.26.23 @ 19:22)  IMPRESSION:  No acute findings within the chest, abdomen or pelvis.  Enlarged right thyroid lobe with solid/cystic appearance measuring 3.1 x   2.3 cm. Further evaluation and follow-up recommended as clinically   indicated.    CT Head No Cont (09.26.23 @ 19:13)  IMPRESSION: No change since 12:49 PM. Anterior parafalcine subdural   hemorrhage with a suggestion of subarachnoid hemorrhage.    CT Head No Cont (09.26.23 @ 12:50)  IMPRESSION:  HEAD CT:  Subdural hematoma left side of anterior falx measuring up to 5   mm in thickness. Likely mild associated subarachnoid hemorrhage.  CERVICAL SPINE CT:  No evidence of an acute cervical spine fracture.  OTHER: 3.5 cm nodule right lobe of the thyroid gland. Correlate with   nonemergent ultrasonography if not recently performed    Xray Knee 3 Views, Left (09.26.23 @ 12:33)  IMPRESSION:  1. No evidence of fracture or dislocation.  2. Mild degenerative osteoarthritis of the medial greater than   patellofemoral compartments.    Xray Tibia + Fibula 2 Views, Left (09.26.23 @ 12:32)  IMPRESSION:  No acute findings

## 2023-09-27 NOTE — DISCHARGE NOTE PROVIDER - NSDCCPCAREPLAN_GEN_ALL_CORE_FT
PRINCIPAL DISCHARGE DIAGNOSIS  Diagnosis: Subdural hemorrhage  Assessment and Plan of Treatment: Patient should follow up with Dr Thurston, the neurosurgeon as an outpatient in 1week. Patient should call to make an appointment upon discharge. Patient should Patient is advised to RETURN TO THE EMERGENCY DEPARTMENT for any of the following - new or worsening pain, persistent headache, visual changes, dizziness, fever/chills, nausea/vomiting, altered mental status, chest pain, shortness of breath, or any other new / worsening symptom.

## 2023-09-27 NOTE — DISCHARGE NOTE PROVIDER - NSDCFUADDAPPT_GEN_ALL_CORE_FT
Patient should follow up with her primary care doctor regarding her recent hospital stay. Please call to make an appointment within 10-14days.

## 2023-09-27 NOTE — CHART NOTE - NSCHARTNOTEFT_GEN_A_CORE
Tertiary Trauma Survey (TTS)    Date of TTS: 09-27-23 @ 10:59                             Admit Date: 09-26-23 @ 18:09      Trauma Activation: Consult    List Injuries Identified to Date:  1) Anterior parafalcine subdural hemorrhage.    List Operative and Interventional Radiological Procedures:   None    Physical Exam:  Neuro: GCS 15, A and O x3, AROM of all extremities. No motor or sensory deficits.  HEENT: Normocephalic atraumatic. PERRLA. Trachea midline.   Pulm/Chest: CTA B/L, nontraumatic chest.  Cardiac: NSR   GI / Abdomen: Nontender, nondistended   Musculoskeletal / Extremities: AROM of all extremities. TTP on L knee, able to weight-bear. Paraspinal cervical tenderness. No midline TTP to the cervical, thoracic, lumbar spine.   Integumentary: LLE/L knee ecchymosis and swelling. No other swelling, ecchymosis, abrasions.      RADIOLOGICAL FINDINGS REVIEW:  09/26/2023 XR LE  Two views of the left leg.  Minimal degenerative change with no fracture, dislocation, suprapatellar effusion or radiographic soft tissue abnormality.  IMPRESSION:  No acute findings    09/26/2023 XR Knee  IMPRESSION:  1. No evidence of fracture or dislocation.  2. Mild degenerative osteoarthritis of the medial greater than patellofemoral compartments.    09/26/2023 CT Cervical Spine, CT Head  IMPRESSION:  HEAD CT: Subdural hematoma left side of anterior falx measuring up to 5 mm in thickness. Likely mild associated subarachnoid hemorrhage.  CERVICAL SPINE CT: No evidence of an acute cervical spine fracture.  OTHER: 3.5 cm nodule right lobe of the thyroid gland. Correlate with nonemergent ultrasonography if not recently performed    09/26/2023 CT Head   IMPRESSION: No change since 12:49 PM. Anterior parafalcine subdural hemorrhage with a suggestion of subarachnoid hemorrhage.    09/26/2023 CT Chest w/ IV Contrast  IMPRESSION:  No acute findings within the chest, abdomen or pelvis.  Enlarged right thyroid lobe with solid/cystic appearance measuring 3.1 x 2.3 cm. Further evaluation and follow-up recommended as clinically indicated    09/26/2023 CT Abdomen  IMPRESSION:  No acute findings within the chest, abdomen or pelvis.  Please refer to detailed findings otherwise described above.      INCIDENTAL FINDINGS:  [ ] No    [X] Yes, Findings are:  09/26/2023 CT Cervical Spine  OTHER: 3.5 cm nodule right lobe of the thyroid gland. Correlate with nonemergent ultrasonography if not recently performed.    [X] Tertiary exam complete, there are no new injuries identified    [ ] Tertiary exam done, new injuries identified are:                [ ] Imaging ordered:

## 2023-09-27 NOTE — DISCHARGE NOTE NURSING/CASE MANAGEMENT/SOCIAL WORK - PATIENT PORTAL LINK FT
You can access the FollowMyHealth Patient Portal offered by [facility] by registering at the following website: [email or web address]. By joining [employer]’s FollowMyHealth portal, you will also be able to view your health information using other applications (apps) compatible with our system.

## 2023-09-27 NOTE — PROGRESS NOTE ADULT - CRITICAL CARE ATTENDING COMMENT
Patient seen and examined at bedside. Patient denies any headaches, nausea, and vomiting. AAOx3. No neural deficits seen on exam. Complains of left knee pain. Per neurosurgery, no further indication for any neurosurgical intervention. Continue Keppra for 7 days. F/U PT/OT recs

## 2023-09-27 NOTE — DISCHARGE NOTE PROVIDER - HOSPITAL COURSE
53 year old F, no PMH, presented after mechanical fall. Patient found to have anterior parafalcine SDH, and associated SAH. Patient's repeat ct head has been stable. Patient remains neurologically intact. Patient's complaining of right lower extremity pain, xray negative. Noted to have some ecchymosis over site. Patient able to ambulate on RLE without issues. Patient tolerating diet and voiding with out issues.     Patient seen and evaluated by PT who agrees with team that patient is safe for discharge home.

## 2023-09-27 NOTE — PROGRESS NOTE ADULT - NS ATTEND AMEND GEN_ALL_CORE FT
I agree with the above. I personally examined and saw the patient. Ms. Hawkins is neurologically intact. I reviewed her head imaging and explained the findings with the assistance of an . Keppra for 7 days, outpatient follow-up in 1 month with repeat head CT before visit. I spent 50 minutes in direct patient care.

## 2023-09-27 NOTE — PROGRESS NOTE ADULT - ASSESSMENT
53yF denies PMH only takes OTC vitamins, presented to [facility] after she had intractable headache and nausea since falling off kitchen counter 9/25/23, +LOC, tx to [facility] after CTH with falcine SDH.  - Repeat CTH yesterday stable  - No focal neurologic deficits    PLAN:  - Patient seen and case d/w Dr. Thurston  - Exam without evidence of focal neurologic deficits, and patient with improved headache  - No absolute contraindication for discharge if otherwise medically optimized per primary team  - Recommend discharging on Keppra 500 mg BID x 7 days  - Will need close outpatient follow up with Dr. Juliet Thurston in about 1 month with repeat CT head  - Discussed with patient that there is no absolute contraindication to returning to work (she states she is a ), but that she may have some continued concussive symptoms for a few weeks to a few months (e.g. headache, dizziness, fatigue, nausea, vomiting, confusion)  - Advised her to hold on vitamins right now along with NSAIDs to avoid worsening hemorrhage  - No further inpatient neurosurgical recommendations  - Reconsult PRN 53yF denies PMH only takes OTC vitamins, presented to [facility] after she had intractable headache and nausea since falling off kitchen counter 9/25/23, +LOC, tx to [facility] after CTH with falcine SDH.  - Repeat CTH yesterday stable  - No focal neurologic deficits    PLAN:  - Patient seen and case d/w Dr. Thurston  - Exam without evidence of focal neurologic deficits, and patient with improved headache  - No absolute contraindication for discharge if otherwise medically optimized per primary team  - Recommend discharging on Keppra 500 mg BID x 7 days  - Will need close outpatient follow up with Dr. Juliet Thurston in about 1 month with repeat CT head  - Discussed with patient that there is no absolute contraindication to returning to work (she states she is a ), but that she may have some continued concussive symptoms for a few weeks to a few months (e.g. headache, dizziness, fatigue, nausea, vomiting, confusion)  - Advised her to hold on supplements, vitamins right now along with NSAIDs to avoid worsening hemorrhage  - No further inpatient neurosurgical recommendations  - Reconsult PRN

## 2023-09-27 NOTE — DISCHARGE NOTE PROVIDER - NSDCACTIVITY_GEN_ALL_CORE
No restrictions/Sex allowed/Do not drive or operate machinery/Showering allowed/Do not make important decisions/No heavy lifting/straining/Follow Instructions Provided by your Surgical Team

## 2023-09-27 NOTE — DISCHARGE NOTE NURSING/CASE MANAGEMENT/SOCIAL WORK - NSDCPEFALRISK_GEN_ALL_CORE
For information on Fall & Injury Prevention, visit: [email or web address] OR  [email or web address] OR  https://www.cdc.gov/steadi/patient.html

## 2023-10-12 ENCOUNTER — APPOINTMENT (OUTPATIENT)
Dept: CT IMAGING | Facility: CLINIC | Age: 53
End: 2023-10-12
Payer: MEDICAID

## 2023-10-12 ENCOUNTER — OUTPATIENT (OUTPATIENT)
Dept: OUTPATIENT SERVICES | Facility: HOSPITAL | Age: 53
LOS: 1 days | End: 2023-10-12

## 2023-10-12 DIAGNOSIS — Z00.8 ENCOUNTER FOR OTHER GENERAL EXAMINATION: ICD-10-CM

## 2023-10-12 PROCEDURE — 70450 CT HEAD/BRAIN W/O DYE: CPT | Mod: 26

## 2023-10-25 ENCOUNTER — APPOINTMENT (OUTPATIENT)
Dept: NEUROSURGERY | Facility: CLINIC | Age: 53
End: 2023-10-25
Payer: MEDICAID

## 2023-10-25 VITALS
TEMPERATURE: 98.3 F | BODY MASS INDEX: 36.82 KG/M2 | HEIGHT: 61 IN | OXYGEN SATURATION: 97 % | HEART RATE: 78 BPM | DIASTOLIC BLOOD PRESSURE: 65 MMHG | SYSTOLIC BLOOD PRESSURE: 116 MMHG | WEIGHT: 195 LBS

## 2023-10-25 DIAGNOSIS — Z78.9 OTHER SPECIFIED HEALTH STATUS: ICD-10-CM

## 2023-10-25 DIAGNOSIS — Z87.19 PERSONAL HISTORY OF OTHER DISEASES OF THE DIGESTIVE SYSTEM: ICD-10-CM

## 2023-10-25 DIAGNOSIS — S06.5XAA TRAUMATIC SUBDURAL HEMORRHAGE WITH LOSS OF CONSCIOUSNESS STATUS UNKNOWN, INITIAL ENCOUNTER: ICD-10-CM

## 2023-10-25 PROCEDURE — 99212 OFFICE O/P EST SF 10 MIN: CPT

## 2024-04-25 ENCOUNTER — OFFICE (OUTPATIENT)
Dept: URBAN - METROPOLITAN AREA CLINIC 104 | Facility: CLINIC | Age: 54
Setting detail: OPHTHALMOLOGY
End: 2024-04-25
Payer: COMMERCIAL

## 2024-04-25 DIAGNOSIS — H01.005: ICD-10-CM

## 2024-04-25 DIAGNOSIS — H11.152: ICD-10-CM

## 2024-04-25 DIAGNOSIS — H11.041: ICD-10-CM

## 2024-04-25 DIAGNOSIS — H01.002: ICD-10-CM

## 2024-04-25 DIAGNOSIS — H43.391: ICD-10-CM

## 2024-04-25 DIAGNOSIS — H16.223: ICD-10-CM

## 2024-04-25 PROCEDURE — 92014 COMPRE OPH EXAM EST PT 1/>: CPT | Performed by: OPTOMETRIST

## 2024-04-25 PROCEDURE — 92285 EXTERNAL OCULAR PHOTOGRAPHY: CPT | Performed by: OPTOMETRIST

## 2024-04-25 ASSESSMENT — LID EXAM ASSESSMENTS
OD_BLEPHARITIS: RLL 1+
OS_BLEPHARITIS: LLL 1+

## 2024-10-24 NOTE — PATIENT PROFILE ADULT - FUNCTIONAL SCREEN CURRENT LEVEL: SWALLOWING (IF SCORE 2 OR MORE FOR ANY ITEM, CONSULT REHAB SERVICES), MLM)
0 = swallows foods/liquids without difficulty
evidenced by NFPE findings, decreased PO intake, and wt loss.